# Patient Record
Sex: FEMALE | Race: WHITE | URBAN - METROPOLITAN AREA
[De-identification: names, ages, dates, MRNs, and addresses within clinical notes are randomized per-mention and may not be internally consistent; named-entity substitution may affect disease eponyms.]

---
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
## 2020-02-04 ENCOUNTER — OUTPATIENT (OUTPATIENT)
Dept: OUTPATIENT SERVICES | Facility: HOSPITAL | Age: 20
LOS: 1 days | Discharge: HOME | End: 2020-02-04
Payer: COMMERCIAL

## 2020-02-04 DIAGNOSIS — J01.90 ACUTE SINUSITIS, UNSPECIFIED: ICD-10-CM

## 2020-02-04 PROCEDURE — 70486 CT MAXILLOFACIAL W/O DYE: CPT | Mod: 26

## 2020-02-07 ENCOUNTER — APPOINTMENT (OUTPATIENT)
Dept: PEDIATRIC DEVELOPMENTAL SERVICES | Facility: CLINIC | Age: 20
End: 2020-02-07
Payer: COMMERCIAL

## 2020-02-07 VITALS
SYSTOLIC BLOOD PRESSURE: 112 MMHG | BODY MASS INDEX: 24.22 KG/M2 | DIASTOLIC BLOOD PRESSURE: 70 MMHG | WEIGHT: 135 LBS | HEART RATE: 80 BPM | HEIGHT: 62.5 IN

## 2020-02-07 DIAGNOSIS — F41.9 ANXIETY DISORDER, UNSPECIFIED: ICD-10-CM

## 2020-02-07 PROBLEM — Z00.00 ENCOUNTER FOR PREVENTIVE HEALTH EXAMINATION: Status: ACTIVE | Noted: 2020-02-07

## 2020-02-07 PROCEDURE — 99212 OFFICE O/P EST SF 10 MIN: CPT

## 2020-05-02 ENCOUNTER — RX RENEWAL (OUTPATIENT)
Age: 20
End: 2020-05-02

## 2022-01-20 ENCOUNTER — APPOINTMENT (OUTPATIENT)
Dept: GASTROENTEROLOGY | Facility: CLINIC | Age: 22
End: 2022-01-20

## 2022-06-28 ENCOUNTER — APPOINTMENT (OUTPATIENT)
Dept: GASTROENTEROLOGY | Facility: CLINIC | Age: 22
End: 2022-06-28
Payer: COMMERCIAL

## 2022-06-28 DIAGNOSIS — Z78.9 OTHER SPECIFIED HEALTH STATUS: ICD-10-CM

## 2022-06-28 DIAGNOSIS — Z86.59 PERSONAL HISTORY OF OTHER MENTAL AND BEHAVIORAL DISORDERS: ICD-10-CM

## 2022-06-28 DIAGNOSIS — Z87.09 PERSONAL HISTORY OF OTHER DISEASES OF THE RESPIRATORY SYSTEM: ICD-10-CM

## 2022-06-28 DIAGNOSIS — K59.00 CONSTIPATION, UNSPECIFIED: ICD-10-CM

## 2022-06-28 PROCEDURE — 99204 OFFICE O/P NEW MOD 45 MIN: CPT | Mod: 95

## 2022-06-28 NOTE — HISTORY OF PRESENT ILLNESS
[Home] : at home, [unfilled] , at the time of the visit. [Medical Office: ([facility] [place])___] : at the medical office located in  [Verbal consent obtained from patient] : the patient, [unfilled] [FreeTextEntry4] : Chandrika Hoang  [de-identified] : 22 year old female patient with years history of chronic constipation alternating with diarrhea , associated with post prandial bloating, and crampy pain. No night symptoms.\par She gets nauseous, occasional vomiting and GERD but no dysphagia.\par Reports halitosis as well.\par No blood in the stool or weight loss.

## 2022-06-28 NOTE — ASSESSMENT
[FreeTextEntry1] : 22 year old female patient with years history of chronic constipation alternating with diarrhea , associated with post prandial bloating, and crampy pain. No night symptoms.\par She gets nauseous, occasional vomiting and GERD but no dysphagia.\par Reports halitosis as well.\par No blood in the stool or weight loss. \par \par Likely IBS-M\par Rec: phazyme 250 tid \par Fiber supplementation \par Food avoidance\par if not better, will get colonoscopy\par Pt to call in 1 month if not better.

## 2022-06-28 NOTE — PHYSICAL EXAM
[General Appearance - Alert] : alert [Hearing Threshold Finger Rub Not [place]] : hearing was normal [] : no respiratory distress [Skin Color & Pigmentation] : normal skin color and pigmentation [Oriented To Time, Place, And Person] : oriented to person, place, and time

## 2022-07-07 ENCOUNTER — APPOINTMENT (OUTPATIENT)
Dept: NEUROPSYCHOLOGY | Facility: CLINIC | Age: 22
End: 2022-07-07

## 2022-07-10 ENCOUNTER — TRANSCRIPTION ENCOUNTER (OUTPATIENT)
Age: 22
End: 2022-07-10

## 2022-07-18 ENCOUNTER — APPOINTMENT (OUTPATIENT)
Dept: OTOLARYNGOLOGY | Facility: CLINIC | Age: 22
End: 2022-07-18

## 2022-07-18 VITALS — WEIGHT: 157 LBS

## 2022-07-18 DIAGNOSIS — R49.0 DYSPHONIA: ICD-10-CM

## 2022-07-18 DIAGNOSIS — F15.10 OTHER STIMULANT ABUSE, UNCOMPLICATED: ICD-10-CM

## 2022-07-18 PROCEDURE — 31575 DIAGNOSTIC LARYNGOSCOPY: CPT

## 2022-07-18 PROCEDURE — 99203 OFFICE O/P NEW LOW 30 MIN: CPT | Mod: 25

## 2022-07-18 NOTE — HISTORY OF PRESENT ILLNESS
[de-identified] : Patient presents today c/o possible vocal cord polyps, sinus infection.  Patient states for about a year or so she  feels occasionally vocal nguyen where her voice disappears . Her throat tightens and she feels like something there.  excessive plaN .    She had a septoplasty 2 years ago . She has some times nasal  congestion . She had tmj surgery 8 yrs.

## 2022-07-18 NOTE — ASSESSMENT
[FreeTextEntry1] : Patient prone to have nodules on vocal cords. Discussed vocal cords nodules pathophysiology.

## 2022-07-27 ENCOUNTER — APPOINTMENT (OUTPATIENT)
Dept: OBGYN | Facility: CLINIC | Age: 22
End: 2022-07-27

## 2022-08-09 ENCOUNTER — RESULT REVIEW (OUTPATIENT)
Age: 22
End: 2022-08-09

## 2022-08-09 ENCOUNTER — OUTPATIENT (OUTPATIENT)
Dept: OUTPATIENT SERVICES | Facility: HOSPITAL | Age: 22
LOS: 1 days | Discharge: HOME | End: 2022-08-09

## 2022-08-09 DIAGNOSIS — R10.2 PELVIC AND PERINEAL PAIN: ICD-10-CM

## 2022-08-09 PROCEDURE — 76856 US EXAM PELVIC COMPLETE: CPT | Mod: 26

## 2022-08-22 ENCOUNTER — APPOINTMENT (OUTPATIENT)
Dept: OBGYN | Facility: CLINIC | Age: 22
End: 2022-08-22

## 2023-06-20 ENCOUNTER — APPOINTMENT (OUTPATIENT)
Dept: OBGYN | Facility: CLINIC | Age: 23
End: 2023-06-20

## 2023-06-26 ENCOUNTER — APPOINTMENT (OUTPATIENT)
Dept: OBGYN | Facility: CLINIC | Age: 23
End: 2023-06-26
Payer: COMMERCIAL

## 2023-06-26 VITALS
DIASTOLIC BLOOD PRESSURE: 72 MMHG | SYSTOLIC BLOOD PRESSURE: 105 MMHG | TEMPERATURE: 98.6 F | HEART RATE: 66 BPM | HEIGHT: 63 IN | WEIGHT: 140 LBS | BODY MASS INDEX: 24.8 KG/M2

## 2023-06-26 DIAGNOSIS — Z87.39 PERSONAL HISTORY OF OTHER DISEASES OF THE MUSCULOSKELETAL SYSTEM AND CONNECTIVE TISSUE: ICD-10-CM

## 2023-06-26 DIAGNOSIS — Z83.3 FAMILY HISTORY OF DIABETES MELLITUS: ICD-10-CM

## 2023-06-26 DIAGNOSIS — Z86.19 PERSONAL HISTORY OF OTHER INFECTIOUS AND PARASITIC DISEASES: ICD-10-CM

## 2023-06-26 LAB
ESTIMATED AVERAGE GLUCOSE: 94 MG/DL
HBA1C MFR BLD HPLC: 4.9 %
HCG UR QL: NEGATIVE
QUALITY CONTROL: YES
TSH SERPL-ACNC: 1.31 UIU/ML

## 2023-06-26 PROCEDURE — 81025 URINE PREGNANCY TEST: CPT

## 2023-06-26 PROCEDURE — 99213 OFFICE O/P EST LOW 20 MIN: CPT | Mod: 25

## 2023-06-26 PROCEDURE — 99385 PREV VISIT NEW AGE 18-39: CPT

## 2023-06-27 LAB
ESTRADIOL SERPL-MCNC: 69 PG/ML
LH SERPL-ACNC: 8.3 IU/L
PROGEST SERPL-MCNC: 2.3 NG/ML
TESTOST FREE SERPL-MCNC: 0 PG/ML
TESTOST SERPL-MCNC: 27.9 NG/DL

## 2023-06-28 LAB
FSH SERPL-MCNC: 4.6 IU/L
PROLACTIN SERPL-MCNC: 13.4 NG/ML

## 2023-07-02 LAB — ESTROGEN SERPL-MCNC: 230 PG/ML

## 2023-07-09 PROBLEM — Z83.3 FAMILY HISTORY OF DIABETES MELLITUS: Status: ACTIVE | Noted: 2023-07-09

## 2023-07-09 PROBLEM — Z86.19 HISTORY OF EPSTEIN-BARR VIRUS INFECTION: Status: RESOLVED | Noted: 2023-07-09 | Resolved: 2023-07-09

## 2023-07-09 PROBLEM — Z87.39 HISTORY OF KYPHOSIS: Status: RESOLVED | Noted: 2023-07-09 | Resolved: 2023-07-09

## 2023-07-09 RX ORDER — FLUOXETINE HYDROCHLORIDE 40 MG/1
40 CAPSULE ORAL
Refills: 0 | Status: ACTIVE | COMMUNITY

## 2023-07-09 RX ORDER — DEXTROAMPHETAMINE SACCHARATE, AMPHETAMINE ASPARTATE, DEXTROAMPHETAMINE SULFATE, AND AMPHETAMINE SULFATE 3.75; 3.75; 3.75; 3.75 MG/1; MG/1; MG/1; MG/1
TABLET ORAL
Refills: 0 | Status: DISCONTINUED | COMMUNITY
End: 2023-07-09

## 2023-07-09 RX ORDER — DULOXETINE HYDROCHLORIDE 20 MG/1
20 CAPSULE, DELAYED RELEASE PELLETS ORAL
Qty: 60 | Refills: 0 | Status: DISCONTINUED | COMMUNITY
Start: 2022-01-27 | End: 2023-07-09

## 2023-07-09 RX ORDER — DEXTROAMPHETAMINE SULFATE, DEXTROAMPHETAMINE SACCHARATE, AMPHETAMINE SULFATE AND AMPHETAMINE ASPARTATE 7.5; 7.5; 7.5; 7.5 MG/1; MG/1; MG/1; MG/1
30 CAPSULE, EXTENDED RELEASE ORAL
Qty: 30 | Refills: 0 | Status: DISCONTINUED | COMMUNITY
Start: 2022-03-10 | End: 2023-07-09

## 2023-07-09 RX ORDER — DULOXETINE HYDROCHLORIDE 30 MG/1
30 CAPSULE, DELAYED RELEASE ORAL
Refills: 0 | Status: DISCONTINUED | COMMUNITY
End: 2023-07-09

## 2023-07-09 NOTE — HISTORY OF PRESENT ILLNESS
MEDICARE WELLNESS VISIT NOTE      HISTORY OF PRESENT ILLNESS:   Ling Herzog presents for her Subsequent Annual Medicare Wellness Visit.   She has complaints or concerns which include discuss vaginal discharge.      Patient Care Team:  Krissy Parker MD as PCP - General (Family Practice)  Evelio Pagan MD (General Surgery)        Patient Active Problem List   Diagnosis   • HLD (hyperlipidemia)   • Elevated blood pressure reading without diagnosis of hypertension   • Screen for colon cancer         Past Medical History:   Diagnosis Date   • Endometriosis    • HLD (hyperlipidemia)    • LGSIL (low grade squamous intraepithelial lesion) on Pap smear    • Obesity          Past Surgical History:   Procedure Laterality Date   • Colonoscopy  01/09/2008   • Colonoscopy  01/08/2019    repeat colon in 10 yrs.   • Colposcopy,loop electrd cervix excis      LEEP   • Occult blood test tube  08/27/2012         Social History     Tobacco Use   • Smoking status: Never Smoker   • Smokeless tobacco: Never Used   Substance Use Topics   • Alcohol use: Not Currently     Frequency: Never     Binge frequency: Never     Comment: wine 1-2/year   • Drug use: No     Drug use:    Drug Use:    No              Family History - Reviewed    Current Outpatient Medications   Medication Sig Dispense Refill   • zoster vaccine recomb adjuvanted (Shingrix) 50 MCG/0.5ML injection Repeat dose in 2 to 6 months (unless 1 dose already given), for a total of 2 doses. 1 each 0   • zoster vaccine recomb adjuvanted (SHINGRIX) 50 MCG/0.5ML injection Repeat dose in 2 to 6 months (unless 1 dose already given), for a total of 2 doses. 1 each 0     No current facility-administered medications for this visit.         Vision and Hearing screens:    Hearing Screening    125Hz 250Hz 500Hz 1000Hz 2000Hz 3000Hz 4000Hz 6000Hz 8000Hz   Right ear:            Left ear:            Comments: Whisper screening test results:  Right - normal  Left -  normal      Advance Directive:   The patient has the following documents:  Power of  for Health Care    Cognitive Assessment: no evidence of cognitive dysfunction by direct observation    Recent PHQ 2/9 Score    PHQ 2:  Date Adult PHQ 2 Score   11/25/2019 0       PHQ 9:       DEPRESSION ASSESSMENT/PLAN:  Depression screening is negative no further plan needed.     Body mass index is 33.71 kg/m².    BMI ASSESSMENT/PLAN:  Patient is overweight.    Pt stays active doing housework and yardwork.        Needed Screening/Treatment:   Immunizations reviewed and patient needs: Influenza and Pneumococcal 23  Needed follow up:  None    See orders.   See Patient Instructions section.   No follow-ups on file.   Depression Screening-2 Questions: Patient was asked \"Over the past 2 weeks, how often have you been bothered by any of the following problems? Little interest or pleasure in doing things? and Feeling down, depressed, or hopeless? Patient scored 0 points.     Ambulation/Fall Risk: Pt. walks independently without restrictions  Patient is steady on their feet and has a Low Risk for falls.       Health Maintenance Due   Topic Date Due   • Influenza Vaccine (1) 09/01/2020   • Medicare Wellness Visit  11/25/2020   • Depression Screening  11/25/2020   • Pneumococcal Vaccine 65+ (2 of 2 - PPSV23) 11/25/2020       Patient is due for the topics as listed above and wishes to proceed with them. Orders placed for Immunization(s) Influenza and Pneumococcal.        Rooming documentation of social history includes tobacco screening only.       [Patient reported PAP Smear was normal] : Patient reported PAP Smear was normal [Y] : Patient reports abnormal menses [Dysmenorrhea] : dysmenorrhea [N] : Patient denies prior pregnancies [Menarche Age: ____] : age at menarche was [unfilled] [No] : Patient does not have concerns regarding sex [Never active] : never active [Regular Cycle Intervals] : periods have been regular [Menstrual Cramps] : menstrual cramps [TextBox_4] : 23-year-old G0 with LMP 6/11/2023 came for annual GYN exam.  Patient denies abnormal uterine bleeding however states her periods are moderate to heavy flow but she gets significant pain with her menstrual cycle.  She denies current pelvic pain, discharge or dysuria but also states she has recurrent symptoms of yeast infection.  She states she gets abnormal discharge with itching.  She has no symptoms currently.  She also complains of excessive hair growth on the face, nipples and in a male pattern.  She also states she has trouble losing weight although she states that she has a healthy diet and exercises.  She has never had any work-up for any of her symptoms.  She has never been sexually active.  Patient has received all 3 HPV Gardasil vaccines.\par \par Patient has many questions regarding contraception management and risks and benefits.  We discussed all options of management including conservative and all routes of medical management.  Patient is mostly concerned for menstrual regulation and symptom control.  After discussing all options patient states she will think about her options and will readdress after her work-up results are completed. [PapSmeardate] : 2019 [TextBox_31] : ?  Unsure if actual Pap smear done at that age and without activity0 [LMPDate] : 6/11/23 [MensesFreq] : 28 [MensesLength] : 5-7 [MensesAmount] : heavy to mod-lite [FreeTextEntry1] : 6/11/23

## 2023-07-09 NOTE — PHYSICAL EXAM
[Appropriately responsive] : appropriately responsive [Alert] : alert [No Acute Distress] : no acute distress [No Lymphadenopathy] : no lymphadenopathy [No Murmurs] : no murmurs [Regular Rate Rhythm] : regular rate rhythm [Soft] : soft [Non-tender] : non-tender [Non-distended] : non-distended [No HSM] : No HSM [No Mass] : no mass [Oriented x3] : oriented x3 [FreeTextEntry2] : Mild hirsutism noted [Examination Of The Breasts] : a normal appearance [No Discharge] : no discharge [No Masses] : no breast masses were palpable [No Lesions] : no lesions  [Labia Majora] : normal [Normal] : normal [Labia Minora] : normal [No Bleeding] : There was no active vaginal bleeding [FreeTextEntry1] : Bimanual exam not performed due to virginal status

## 2023-07-09 NOTE — DISCUSSION/SUMMARY
[FreeTextEntry1] : A: 23-year-old for annual GYN exam, dysmenorrhea, hirsutism, anxiety, recurrent yeast symptoms\par \par P: Limited GYN exam done\par Hormone panel done\par CBC and A1c done\par Referral for pelvic ultrasound given\par Safe sex practices if becomes active discussed\par Contraception counseling done-patient will think about her options\par Menstrual diary encouraged\par Pain management discussed\par Encourage healthy diet and exercise\par Follow-up after imaging and test resulted or as needed

## 2023-07-10 ENCOUNTER — RESULT REVIEW (OUTPATIENT)
Age: 23
End: 2023-07-10

## 2023-07-10 ENCOUNTER — OUTPATIENT (OUTPATIENT)
Dept: OUTPATIENT SERVICES | Facility: HOSPITAL | Age: 23
LOS: 1 days | End: 2023-07-10
Payer: COMMERCIAL

## 2023-07-10 DIAGNOSIS — Z01.419 ENCOUNTER FOR GYNECOLOGICAL EXAMINATION (GENERAL) (ROUTINE) WITHOUT ABNORMAL FINDINGS: ICD-10-CM

## 2023-07-10 DIAGNOSIS — Z00.8 ENCOUNTER FOR OTHER GENERAL EXAMINATION: ICD-10-CM

## 2023-07-10 DIAGNOSIS — N94.6 DYSMENORRHEA, UNSPECIFIED: ICD-10-CM

## 2023-07-10 PROCEDURE — 76856 US EXAM PELVIC COMPLETE: CPT

## 2023-07-10 PROCEDURE — 76856 US EXAM PELVIC COMPLETE: CPT | Mod: 26

## 2023-07-11 DIAGNOSIS — Z01.419 ENCOUNTER FOR GYNECOLOGICAL EXAMINATION (GENERAL) (ROUTINE) WITHOUT ABNORMAL FINDINGS: ICD-10-CM

## 2023-07-11 DIAGNOSIS — N94.6 DYSMENORRHEA, UNSPECIFIED: ICD-10-CM

## 2023-07-19 ENCOUNTER — NON-APPOINTMENT (OUTPATIENT)
Age: 23
End: 2023-07-19

## 2023-08-15 ENCOUNTER — APPOINTMENT (OUTPATIENT)
Dept: OBGYN | Facility: CLINIC | Age: 23
End: 2023-08-15

## 2023-08-17 ENCOUNTER — APPOINTMENT (OUTPATIENT)
Dept: OBGYN | Facility: CLINIC | Age: 23
End: 2023-08-17
Payer: COMMERCIAL

## 2023-08-17 PROCEDURE — 99443: CPT

## 2023-09-05 ENCOUNTER — APPOINTMENT (OUTPATIENT)
Dept: OTOLARYNGOLOGY | Facility: CLINIC | Age: 23
End: 2023-09-05

## 2024-03-27 RX ORDER — SIMETHICONE 250 MG/1
250 CAPSULE, GELATIN COATED ORAL
Qty: 90 | Refills: 6 | Status: DISCONTINUED | COMMUNITY
Start: 2022-06-28 | End: 2024-03-27

## 2024-03-27 RX ORDER — SODIUM PICOSULFATE, MAGNESIUM OXIDE, AND ANHYDROUS CITRIC ACID 10; 3.5; 12 MG/160ML; G/160ML; G/160ML
10-3.5-12 MG-GM LIQUID ORAL
Qty: 2 | Refills: 0 | Status: DISCONTINUED | COMMUNITY
Start: 2022-11-09 | End: 2024-03-27

## 2024-03-27 RX ORDER — ALBUTEROL SULFATE 90 UG/1
108 (90 BASE) INHALANT RESPIRATORY (INHALATION)
Qty: 8 | Refills: 0 | Status: DISCONTINUED | COMMUNITY
Start: 2022-02-02 | End: 2024-03-27

## 2024-03-27 RX ORDER — CHROMIUM 200 MCG
TABLET ORAL
Refills: 0 | Status: DISCONTINUED | COMMUNITY
End: 2024-03-27

## 2024-03-27 RX ORDER — ALBUTEROL 90 MCG
AEROSOL (GRAM) INHALATION
Refills: 0 | Status: DISCONTINUED | COMMUNITY
End: 2024-03-27

## 2024-04-02 ENCOUNTER — APPOINTMENT (OUTPATIENT)
Dept: GASTROENTEROLOGY | Facility: CLINIC | Age: 24
End: 2024-04-02
Payer: COMMERCIAL

## 2024-04-02 DIAGNOSIS — K62.5 HEMORRHAGE OF ANUS AND RECTUM: ICD-10-CM

## 2024-04-02 DIAGNOSIS — Z80.3 FAMILY HISTORY OF MALIGNANT NEOPLASM OF BREAST: ICD-10-CM

## 2024-04-02 DIAGNOSIS — Z80.1 FAMILY HISTORY OF MALIGNANT NEOPLASM OF TRACHEA, BRONCHUS AND LUNG: ICD-10-CM

## 2024-04-02 DIAGNOSIS — R10.9 UNSPECIFIED ABDOMINAL PAIN: ICD-10-CM

## 2024-04-02 DIAGNOSIS — Z80.0 FAMILY HISTORY OF MALIGNANT NEOPLASM OF DIGESTIVE ORGANS: ICD-10-CM

## 2024-04-02 DIAGNOSIS — R19.7 DIARRHEA, UNSPECIFIED: ICD-10-CM

## 2024-04-02 PROCEDURE — 99204 OFFICE O/P NEW MOD 45 MIN: CPT

## 2024-04-02 RX ORDER — BACILLUS COAGULANS/INULIN 1B-250 MG
CAPSULE ORAL
Refills: 0 | Status: ACTIVE | COMMUNITY

## 2024-04-02 RX ORDER — ASCORBIC ACID 500 MG
TABLET ORAL
Refills: 0 | Status: DISCONTINUED | COMMUNITY
End: 2024-04-02

## 2024-04-02 RX ORDER — SODIUM SULFATE, MAGNESIUM SULFATE, AND POTASSIUM CHLORIDE 17.75; 2.7; 2.25 G/1; G/1; G/1
1479-225-188 TABLET ORAL
Qty: 24 | Refills: 0 | Status: ACTIVE | COMMUNITY
Start: 2024-04-02 | End: 1900-01-01

## 2024-04-02 RX ORDER — ALBUTEROL 90 MCG
AEROSOL (GRAM) INHALATION
Refills: 0 | Status: ACTIVE | COMMUNITY

## 2024-04-02 RX ORDER — ZINC SULFATE 50(220)MG
CAPSULE ORAL
Refills: 0 | Status: DISCONTINUED | COMMUNITY
End: 2024-04-02

## 2024-04-02 NOTE — ASSESSMENT
[FreeTextEntry1] : 24 year old female patient average risk for CRC, with years history of chronic constipation alternating with diarrhea , associated with post prandial bloating, and crampy pain.She stated the abdominal pain sometimes goes away with a BM.  She has been having diarrhea alternating with normal stools recently. She does note diarrhea after ingesting dairy foods.  No night symptoms. She is here due to rectal bleeding, blood in the stools for the past 3-4 weeks.   She gets nauseous, occasional vomiting and GERD which has improved since stopping coffee but no dysphagia.    Likely IBS-M Rectal bleeding likely due to hemorrhoids family hx of celiac disease  - Will do an EGD with duo bx, and colonoscopy with random bx; risks and benefits discussed - US of Abdomen  pt to call for results Risks and benefits discussed with patient.

## 2024-04-02 NOTE — REVIEW OF SYSTEMS
[As Noted in HPI] : as noted in HPI [Abdominal Pain] : abdominal pain [Diarrhea] : diarrhea [Bleeding] : bleeding [Negative] : Heme/Lymph

## 2024-04-02 NOTE — HISTORY OF PRESENT ILLNESS
[FreeTextEntry1] : 24 year old female patient average risk for CRC, with years history of chronic constipation alternating with diarrhea , associated with post prandial bloating, and crampy pain.She stated the abdominal pain sometimes goes away with a BM.  She has been having diarrhea alternating with normal stools recently. She does note diarrhea after ingesting dairy foods.  No night symptoms. She is here due to rectal bleeding, blood in the stools for the past 3-4 weeks.   She gets nauseous, occasional vomiting and GERD which has improved since stopping coffee but no dysphagia.

## 2024-04-02 NOTE — PHYSICAL EXAM
[Alert] : alert [Normal Voice/Communication] : normal voice/communication [Healthy Appearing] : healthy appearing [No Acute Distress] : no acute distress [Sclera] : the sclera and conjunctiva were normal [Hearing Threshold Finger Rub Not [place]] : hearing was normal [Normal Lips/Gums] : the lips and gums were normal [Oropharynx] : the oropharynx was normal [No Neck Mass] : no neck mass was observed [Normal Appearance] : the appearance of the neck was normal [No Respiratory Distress] : no respiratory distress [No Acc Muscle Use] : no accessory muscle use [Respiration, Rhythm And Depth] : normal respiratory rhythm and effort [Auscultation Breath Sounds / Voice Sounds] : lungs were clear to auscultation bilaterally [Heart Rate And Rhythm] : heart rate was normal and rhythm regular [Normal S1, S2] : normal S1 and S2 [Bowel Sounds] : normal bowel sounds [Murmurs] : no murmurs [Abdomen Tenderness] : non-tender [No Masses] : no abdominal mass palpated [Abdomen Soft] : soft [] : no hepatosplenomegaly [Oriented To Time, Place, And Person] : oriented to person, place, and time

## 2024-04-22 ENCOUNTER — TRANSCRIPTION ENCOUNTER (OUTPATIENT)
Age: 24
End: 2024-04-22

## 2024-04-22 ENCOUNTER — RESULT REVIEW (OUTPATIENT)
Age: 24
End: 2024-04-22

## 2024-04-22 ENCOUNTER — OUTPATIENT (OUTPATIENT)
Dept: OUTPATIENT SERVICES | Facility: HOSPITAL | Age: 24
LOS: 1 days | Discharge: HOME | End: 2024-04-22
Payer: COMMERCIAL

## 2024-04-22 VITALS
SYSTOLIC BLOOD PRESSURE: 101 MMHG | DIASTOLIC BLOOD PRESSURE: 68 MMHG | HEIGHT: 63 IN | TEMPERATURE: 98 F | HEART RATE: 47 BPM | WEIGHT: 128.97 LBS | RESPIRATION RATE: 18 BRPM

## 2024-04-22 VITALS
SYSTOLIC BLOOD PRESSURE: 107 MMHG | OXYGEN SATURATION: 99 % | DIASTOLIC BLOOD PRESSURE: 67 MMHG | RESPIRATION RATE: 18 BRPM | HEART RATE: 58 BPM

## 2024-04-22 DIAGNOSIS — S02.609A FRACTURE OF MANDIBLE, UNSPECIFIED, INITIAL ENCOUNTER FOR CLOSED FRACTURE: Chronic | ICD-10-CM

## 2024-04-22 DIAGNOSIS — Z98.890 OTHER SPECIFIED POSTPROCEDURAL STATES: Chronic | ICD-10-CM

## 2024-04-22 DIAGNOSIS — Z12.11 ENCOUNTER FOR SCREENING FOR MALIGNANT NEOPLASM OF COLON: ICD-10-CM

## 2024-04-22 DIAGNOSIS — R10.9 UNSPECIFIED ABDOMINAL PAIN: ICD-10-CM

## 2024-04-22 PROCEDURE — 88305 TISSUE EXAM BY PATHOLOGIST: CPT

## 2024-04-22 PROCEDURE — 81025 URINE PREGNANCY TEST: CPT

## 2024-04-22 PROCEDURE — 43239 EGD BIOPSY SINGLE/MULTIPLE: CPT | Mod: XS

## 2024-04-22 PROCEDURE — 88312 SPECIAL STAINS GROUP 1: CPT | Mod: 26

## 2024-04-22 PROCEDURE — 88312 SPECIAL STAINS GROUP 1: CPT

## 2024-04-22 PROCEDURE — 88305 TISSUE EXAM BY PATHOLOGIST: CPT | Mod: 26

## 2024-04-22 PROCEDURE — 45380 COLONOSCOPY AND BIOPSY: CPT

## 2024-04-22 RX ORDER — FLUOXETINE HCL 10 MG
1 CAPSULE ORAL
Refills: 0 | DISCHARGE

## 2024-04-22 NOTE — H&P PST ADULT - HISTORY OF PRESENT ILLNESS
24 year old female patient average risk for CRC, with years history of chronic constipation alternating with diarrhea , associated with post prandial bloating, and crampy pain.She stated the abdominal pain sometimes goes away with a BM. She has been having diarrhea alternating with normal stools recently. She does note diarrhea after ingesting dairy foods. No night symptoms. She is here due to rectal bleeding, blood in the stools for the past 3-4 weeks.     She gets nauseous, occasional vomiting and GERD which has improved since stopping coffee but no dysphagia.

## 2024-04-24 LAB
SURGICAL PATHOLOGY STUDY: SIGNIFICANT CHANGE UP
SURGICAL PATHOLOGY STUDY: SIGNIFICANT CHANGE UP

## 2024-04-25 DIAGNOSIS — K62.5 HEMORRHAGE OF ANUS AND RECTUM: ICD-10-CM

## 2024-04-25 DIAGNOSIS — K64.4 RESIDUAL HEMORRHOIDAL SKIN TAGS: ICD-10-CM

## 2024-04-25 DIAGNOSIS — K29.50 UNSPECIFIED CHRONIC GASTRITIS WITHOUT BLEEDING: ICD-10-CM

## 2024-04-25 DIAGNOSIS — J45.909 UNSPECIFIED ASTHMA, UNCOMPLICATED: ICD-10-CM

## 2024-04-25 DIAGNOSIS — K31.89 OTHER DISEASES OF STOMACH AND DUODENUM: ICD-10-CM

## 2024-04-25 DIAGNOSIS — K29.80 DUODENITIS WITHOUT BLEEDING: ICD-10-CM

## 2024-04-25 DIAGNOSIS — K52.9 NONINFECTIVE GASTROENTERITIS AND COLITIS, UNSPECIFIED: ICD-10-CM

## 2024-04-25 DIAGNOSIS — F41.9 ANXIETY DISORDER, UNSPECIFIED: ICD-10-CM

## 2024-05-06 PROBLEM — F41.9 ANXIETY DISORDER, UNSPECIFIED: Chronic | Status: ACTIVE | Noted: 2024-04-22

## 2024-05-06 PROBLEM — J45.909 UNSPECIFIED ASTHMA, UNCOMPLICATED: Chronic | Status: ACTIVE | Noted: 2024-04-22

## 2024-06-03 ENCOUNTER — APPOINTMENT (OUTPATIENT)
Dept: OBGYN | Facility: CLINIC | Age: 24
End: 2024-06-03
Payer: COMMERCIAL

## 2024-06-03 VITALS
BODY MASS INDEX: 23.04 KG/M2 | TEMPERATURE: 98.6 F | WEIGHT: 130 LBS | HEIGHT: 63 IN | SYSTOLIC BLOOD PRESSURE: 116 MMHG | HEART RATE: 66 BPM | DIASTOLIC BLOOD PRESSURE: 70 MMHG

## 2024-06-03 DIAGNOSIS — Z01.419 ENCOUNTER FOR GYNECOLOGICAL EXAMINATION (GENERAL) (ROUTINE) W/OUT ABNORMAL FINDINGS: ICD-10-CM

## 2024-06-03 DIAGNOSIS — L68.0 HIRSUTISM: ICD-10-CM

## 2024-06-03 DIAGNOSIS — N83.209 UNSPECIFIED OVARIAN CYST, UNSPECIFIED SIDE: ICD-10-CM

## 2024-06-03 DIAGNOSIS — Z71.89 OTHER SPECIFIED COUNSELING: ICD-10-CM

## 2024-06-03 DIAGNOSIS — Z30.09 ENCOUNTER FOR OTHER GENERAL COUNSELING AND ADVICE ON CONTRACEPTION: ICD-10-CM

## 2024-06-03 DIAGNOSIS — N94.6 DYSMENORRHEA, UNSPECIFIED: ICD-10-CM

## 2024-06-03 DIAGNOSIS — N89.8 OTHER SPECIFIED NONINFLAMMATORY DISORDERS OF VAGINA: ICD-10-CM

## 2024-06-03 DIAGNOSIS — Z71.3 DIETARY COUNSELING AND SURVEILLANCE: ICD-10-CM

## 2024-06-03 DIAGNOSIS — Z71.2 PERSON CONSULTING FOR EXPLANATION OF EXAMINATION OR TEST FINDINGS: ICD-10-CM

## 2024-06-03 PROCEDURE — 99213 OFFICE O/P EST LOW 20 MIN: CPT | Mod: 25

## 2024-06-03 PROCEDURE — 99395 PREV VISIT EST AGE 18-39: CPT | Mod: 25

## 2024-06-03 RX ORDER — FLUCONAZOLE 150 MG/1
150 TABLET ORAL
Qty: 1 | Refills: 1 | Status: ACTIVE | COMMUNITY
Start: 2024-06-03 | End: 1900-01-01

## 2024-06-04 LAB
ESTIMATED AVERAGE GLUCOSE: 91 MG/DL
ESTRADIOL SERPL-MCNC: 42 PG/ML
FSH SERPL-MCNC: 7.7 IU/L
HBA1C MFR BLD HPLC: 4.8 %
PROGEST SERPL-MCNC: 0.34 NG/ML
TSH SERPL-ACNC: 1.31 UIU/ML

## 2024-06-05 PROBLEM — N94.6 DYSMENORRHEA: Status: ACTIVE | Noted: 2023-06-26

## 2024-06-05 PROBLEM — Z01.419 ENCOUNTER FOR ANNUAL ROUTINE GYNECOLOGICAL EXAMINATION: Status: ACTIVE | Noted: 2023-06-26

## 2024-06-05 PROBLEM — Z71.3 DIETARY COUNSELING: Status: ACTIVE | Noted: 2023-08-20

## 2024-06-05 PROBLEM — L68.0 HIRSUTISM: Status: ACTIVE | Noted: 2023-06-26

## 2024-06-05 PROBLEM — Z71.89 OTHER SPECIFIED COUNSELING: Status: ACTIVE | Noted: 2023-07-09

## 2024-06-05 PROBLEM — Z30.09 GENERAL COUNSELING AND ADVICE FOR CONTRACEPTIVE MANAGEMENT: Status: ACTIVE | Noted: 2023-07-09

## 2024-06-05 PROBLEM — N83.209 OVARIAN CYST: Status: ACTIVE | Noted: 2023-08-17

## 2024-06-05 PROBLEM — Z71.2 ENCOUNTER TO DISCUSS TEST RESULTS: Status: ACTIVE | Noted: 2023-08-20

## 2024-06-05 LAB
LH SERPL-ACNC: 5.8 IU/L
PROLACTIN SERPL-MCNC: 10.9 NG/ML

## 2024-06-05 RX ORDER — FLUOXETINE HYDROCHLORIDE 10 MG/1
10 CAPSULE ORAL
Qty: 30 | Refills: 0 | Status: ACTIVE | COMMUNITY
Start: 2024-06-03

## 2024-06-05 NOTE — HISTORY OF PRESENT ILLNESS
[Normal Amount/Duration] :  normal amount and duration [Regular Cycle Intervals] : periods have been regular [Frequency: Q ___ days] : menstrual periods occur approximately every [unfilled] days [Menarche Age: ____] : age at menarche was [unfilled] [Never active] : never active [N] : Patient denies prior pregnancies [TextBox_4] : 24-year-old G0 with LMP 5/30/2024 came for annual GYN exam. Patient denies abnormal uterine bleeding but still c/o dysmenorrhea with sx of PCOS with hirsutism and hormonal acne. She had sonogram and hormones tested last year and was suspicious clinically for PCOS but not diagnostic.  She had declined medical mgmt at that time. She does c/o vaginal discharge with itching.  She denies pelvic pain or dysuria at present.   She denies h/o STDs or fibroids. She did have evidence of ovarian cyst on last sonogram but she is asx.   She has never been sexually active. Patient has received all 3 HPV Gardasil vaccines.  Patient has many questions regarding contraception management and risks and benefits. We discussed all options of management including conservative and all routes of medical management. Patient is mostly concerned for menstrual regulation and symptom control. After discussing all options patient states she will think about her options but does not want any medical mgmt at this time.  Will recheck hormones and sonogram and reassess.  [LMPDate] : 5/30/24 [FreeTextEntry1] : 5/30/24 [No] : No

## 2024-06-05 NOTE — DISCUSSION/SUMMARY
[FreeTextEntry1] : A: 23yo for annual GYN exam, dysmenorrhea, vaginal discharge/itching, hirsutism/acne  P: Limited GYN exam done     hormone panel     Diflucan rx sent     safe sex practices if active      pain and bleeding precautions - menstrual diary encouraged      referral for pelvic sonogram given     pain mgmt PRN     contraception counseling done - nothing wanted at this time     encourage healthy diet and exercise     f/u after tests and imaging resulted or PRN

## 2024-06-05 NOTE — PHYSICAL EXAM
[Chaperone Declined] : Patient declined chaperone [Appropriately responsive] : appropriately responsive [Alert] : alert [No Acute Distress] : no acute distress [No Lymphadenopathy] : no lymphadenopathy [Regular Rate Rhythm] : regular rate rhythm [Soft] : soft [Non-tender] : non-tender [Non-distended] : non-distended [No Lesions] : no lesions [No Mass] : no mass [Oriented x3] : oriented x3 [Examination Of The Breasts] : a normal appearance [No Discharge] : no discharge [No Masses] : no breast masses were palpable [Labia Majora] : normal [Labia Minora] : normal [Normal] : normal [No Bleeding] : There was no active vaginal bleeding [FreeTextEntry1] : speculum and bimanual exam not performed due to virgin status

## 2024-06-09 LAB
A VAGINAE DNA VAG QL NAA+PROBE: NORMAL
BVAB2 DNA VAG QL NAA+PROBE: NORMAL
C KRUSEI DNA VAG QL NAA+PROBE: NEGATIVE
C TRACH RRNA SPEC QL NAA+PROBE: NEGATIVE
CANDIDA DNA VAG QL NAA+PROBE: NEGATIVE
ESTROGEN SERPL-MCNC: 146 PG/ML
MEGA1 DNA VAG QL NAA+PROBE: NORMAL
N GONORRHOEA RRNA SPEC QL NAA+PROBE: NEGATIVE
T VAGINALIS RRNA SPEC QL NAA+PROBE: NEGATIVE
TESTOST FREE SERPL-MCNC: 0.6 PG/ML
TESTOST SERPL-MCNC: 22.8 NG/DL

## 2024-06-13 ENCOUNTER — OUTPATIENT (OUTPATIENT)
Dept: OUTPATIENT SERVICES | Facility: HOSPITAL | Age: 24
LOS: 1 days | End: 2024-06-13
Payer: COMMERCIAL

## 2024-06-13 ENCOUNTER — RESULT REVIEW (OUTPATIENT)
Age: 24
End: 2024-06-13

## 2024-06-13 DIAGNOSIS — Z00.8 ENCOUNTER FOR OTHER GENERAL EXAMINATION: ICD-10-CM

## 2024-06-13 DIAGNOSIS — N94.6 DYSMENORRHEA, UNSPECIFIED: ICD-10-CM

## 2024-06-13 DIAGNOSIS — Z98.890 OTHER SPECIFIED POSTPROCEDURAL STATES: Chronic | ICD-10-CM

## 2024-06-13 DIAGNOSIS — S02.609A FRACTURE OF MANDIBLE, UNSPECIFIED, INITIAL ENCOUNTER FOR CLOSED FRACTURE: Chronic | ICD-10-CM

## 2024-06-13 PROCEDURE — 76856 US EXAM PELVIC COMPLETE: CPT

## 2024-06-13 PROCEDURE — 76856 US EXAM PELVIC COMPLETE: CPT | Mod: 26

## 2024-06-14 DIAGNOSIS — N94.6 DYSMENORRHEA, UNSPECIFIED: ICD-10-CM

## 2024-12-27 ENCOUNTER — APPOINTMENT (OUTPATIENT)
Dept: RHEUMATOLOGY | Facility: CLINIC | Age: 24
End: 2024-12-27

## 2025-03-25 ENCOUNTER — NON-APPOINTMENT (OUTPATIENT)
Age: 25
End: 2025-03-25

## 2025-03-26 ENCOUNTER — NON-APPOINTMENT (OUTPATIENT)
Age: 25
End: 2025-03-26

## 2025-03-26 ENCOUNTER — APPOINTMENT (OUTPATIENT)
Dept: OBGYN | Facility: CLINIC | Age: 25
End: 2025-03-26
Payer: COMMERCIAL

## 2025-03-26 VITALS
HEART RATE: 95 BPM | WEIGHT: 141 LBS | BODY MASS INDEX: 24.98 KG/M2 | HEIGHT: 63 IN | DIASTOLIC BLOOD PRESSURE: 81 MMHG | SYSTOLIC BLOOD PRESSURE: 117 MMHG

## 2025-03-26 DIAGNOSIS — N83.201 UNSPECIFIED OVARIAN CYST, RIGHT SIDE: ICD-10-CM

## 2025-03-26 DIAGNOSIS — R39.89 OTHER SYMPTOMS AND SIGNS INVOLVING THE GENITOURINARY SYSTEM: ICD-10-CM

## 2025-03-26 DIAGNOSIS — Z01.419 ENCOUNTER FOR GYNECOLOGICAL EXAMINATION (GENERAL) (ROUTINE) W/OUT ABNORMAL FINDINGS: ICD-10-CM

## 2025-03-26 DIAGNOSIS — Z30.09 ENCOUNTER FOR OTHER GENERAL COUNSELING AND ADVICE ON CONTRACEPTION: ICD-10-CM

## 2025-03-26 DIAGNOSIS — N92.0 EXCESSIVE AND FREQUENT MENSTRUATION WITH REGULAR CYCLE: ICD-10-CM

## 2025-03-26 DIAGNOSIS — N89.8 OTHER SPECIFIED NONINFLAMMATORY DISORDERS OF VAGINA: ICD-10-CM

## 2025-03-26 PROCEDURE — 99385 PREV VISIT NEW AGE 18-39: CPT | Mod: 25

## 2025-03-26 PROCEDURE — 99459 PELVIC EXAMINATION: CPT

## 2025-03-26 PROCEDURE — 81003 URINALYSIS AUTO W/O SCOPE: CPT | Mod: QW

## 2025-03-26 PROCEDURE — 76830 TRANSVAGINAL US NON-OB: CPT

## 2025-03-26 RX ORDER — PROPRANOLOL HYDROCHLORIDE 80 MG/1
TABLET ORAL
Refills: 0 | Status: ACTIVE | COMMUNITY

## 2025-03-26 RX ORDER — VENLAFAXINE 37.5 MG/1
TABLET ORAL
Refills: 0 | Status: ACTIVE | COMMUNITY

## 2025-03-28 LAB — HPV HIGH+LOW RISK DNA PNL CVX: NOT DETECTED

## 2025-04-03 LAB — CYTOLOGY CVX/VAG DOC THIN PREP: NORMAL

## 2025-07-16 PROBLEM — E28.2 PCOS (POLYCYSTIC OVARIAN SYNDROME): Status: ACTIVE | Noted: 2025-07-16

## 2025-07-18 ENCOUNTER — LABORATORY RESULT (OUTPATIENT)
Age: 25
End: 2025-07-18

## 2025-07-21 ENCOUNTER — APPOINTMENT (OUTPATIENT)
Age: 25
End: 2025-07-21

## 2025-07-21 LAB
DHEA-S SERPL-MCNC: 144 UG/DL
ESTRADIOL SERPL-MCNC: 73 PG/ML
FSH SERPL-MCNC: 2.9 IU/L
INSULIN SERPL-MCNC: 10.4 UU/ML
LH SERPL-ACNC: 5.8 IU/L
T3FREE SERPL-MCNC: 3.18 PG/ML
T4 FREE SERPL-MCNC: 1 NG/DL
THYROPEROXIDASE AB SERPL IA-ACNC: 11 IU/ML
TSH SERPL-ACNC: 1.27 UIU/ML

## 2025-07-22 LAB
TESTOST FREE SERPL-MCNC: 0.3 PG/ML
TESTOST SERPL-MCNC: 8.6 NG/DL

## 2025-09-05 ENCOUNTER — APPOINTMENT (OUTPATIENT)
Age: 25
End: 2025-09-05
Payer: COMMERCIAL

## 2025-09-05 VITALS
BODY MASS INDEX: 26.93 KG/M2 | HEART RATE: 66 BPM | WEIGHT: 152 LBS | HEIGHT: 63 IN | DIASTOLIC BLOOD PRESSURE: 78 MMHG | SYSTOLIC BLOOD PRESSURE: 119 MMHG

## 2025-09-05 DIAGNOSIS — Z78.9 OTHER SPECIFIED HEALTH STATUS: ICD-10-CM

## 2025-09-05 DIAGNOSIS — Z30.9 ENCOUNTER FOR CONTRACEPTIVE MANAGEMENT, UNSPECIFIED: ICD-10-CM

## 2025-09-05 PROCEDURE — 99214 OFFICE O/P EST MOD 30 MIN: CPT

## 2025-09-05 RX ORDER — DROSPIRENONE AND ETHINYL ESTRADIOL 0.02-3(28)
3-0.02 KIT ORAL DAILY
Qty: 1 | Refills: 11 | Status: ACTIVE | COMMUNITY
Start: 2025-09-05 | End: 1900-01-01

## 2025-09-05 RX ORDER — BUSPIRONE HCL 5 MG
5 TABLET ORAL
Refills: 0 | Status: ACTIVE | COMMUNITY

## 2025-09-05 RX ORDER — VENLAFAXINE HYDROCHLORIDE 75 MG/1
75 CAPSULE, EXTENDED RELEASE ORAL
Refills: 0 | Status: ACTIVE | COMMUNITY

## 2025-09-05 RX ORDER — PROPRANOLOL HYDROCHLORIDE 20 MG/1
20 TABLET ORAL
Refills: 0 | Status: ACTIVE | COMMUNITY